# Patient Record
Sex: MALE | Race: WHITE | NOT HISPANIC OR LATINO | Employment: FULL TIME | URBAN - METROPOLITAN AREA
[De-identification: names, ages, dates, MRNs, and addresses within clinical notes are randomized per-mention and may not be internally consistent; named-entity substitution may affect disease eponyms.]

---
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
## 2024-03-14 ENCOUNTER — OFFICE VISIT (OUTPATIENT)
Dept: URGENT CARE | Facility: CLINIC | Age: 24
End: 2024-03-14
Payer: COMMERCIAL

## 2024-03-14 VITALS
BODY MASS INDEX: 20.73 KG/M2 | RESPIRATION RATE: 16 BRPM | OXYGEN SATURATION: 97 % | WEIGHT: 140 LBS | DIASTOLIC BLOOD PRESSURE: 67 MMHG | HEART RATE: 67 BPM | TEMPERATURE: 99 F | SYSTOLIC BLOOD PRESSURE: 112 MMHG | HEIGHT: 69 IN

## 2024-03-14 DIAGNOSIS — Z20.2 CHLAMYDIA CONTACT: Primary | ICD-10-CM

## 2024-03-14 DIAGNOSIS — Z20.2 ENCOUNTER FOR ASSESSMENT OF STD EXPOSURE: ICD-10-CM

## 2024-03-14 PROCEDURE — 99203 OFFICE O/P NEW LOW 30 MIN: CPT | Mod: S$GLB,,, | Performed by: STUDENT IN AN ORGANIZED HEALTH CARE EDUCATION/TRAINING PROGRAM

## 2024-03-14 RX ORDER — DOXYCYCLINE 100 MG/1
100 CAPSULE ORAL 2 TIMES DAILY
Qty: 14 CAPSULE | Refills: 0 | Status: SHIPPED | OUTPATIENT
Start: 2024-03-14 | End: 2024-03-21

## 2024-03-14 NOTE — PATIENT INSTRUCTIONS
Please follow up with your primary care provider within 2-5 days if your signs and symptoms have not resolved or worsen.     If your condition worsens or fails to improve we recommend that you receive another evaluation at the emergency room immediately or contact your primary medical clinic to discuss your concerns.   You must understand that you have received an Urgent Care treatment only and that you may be released before all of your medical problems are known or treated. You, the patient, will arrange for follow up care as instructed.           Please be sure to complete the full course of antibiotics.  We will call you within 1 week.  If you do not hear from us within 1 week then please give us a call.

## 2024-03-14 NOTE — PROGRESS NOTES
"Subjective:      Patient ID: Arjun Shepard is a 23 y.o. male.    Vitals:  height is 5' 9" (1.753 m) and weight is 63.5 kg (140 lb). His temperature is 98.7 °F (37.1 °C). His blood pressure is 112/67 and his pulse is 67. His respiration is 16 and oxygen saturation is 97%.     Chief Complaint: Exposure to STD    Patient is here for STD testing. His partner recently tested positive for chlamydia. He is not currently having any symptoms.    Exposure to STD  The patient's pertinent negatives include no genital itching, pelvic pain, penile discharge or testicular pain. This is a new problem. The patient is experiencing no pain. Pertinent negatives include no dysuria, urgency or urinary retention. Nothing aggravates the symptoms. He has tried nothing for the symptoms. He is sexually active. He inconsistently uses condoms. Yes, his partner has an STD.       Genitourinary:  Negative for dysuria, urgency, penile discharge, testicular pain and pelvic pain.      Objective:     Physical Exam   HENT:   Head: Normocephalic and atraumatic.   Nose: Nose normal.   Cardiovascular: Normal rate.   Pulmonary/Chest: Breath sounds normal.   Abdominal: Normal appearance.   Neurological: He is alert.   Psychiatric: His behavior is normal. Mood normal.       Assessment:     1. Chlamydia contact    2. Encounter for assessment of STD exposure        Plan:       Chlamydia contact  -     doxycycline (VIBRAMYCIN) 100 MG Cap; Take 1 capsule (100 mg total) by mouth 2 (two) times daily. for 7 days  Dispense: 14 capsule; Refill: 0  -     SureSwab(R)Chlamydia/N.Gonorrhoeae RNA,TMA    Encounter for assessment of STD exposure  -     doxycycline (VIBRAMYCIN) 100 MG Cap; Take 1 capsule (100 mg total) by mouth 2 (two) times daily. for 7 days  Dispense: 14 capsule; Refill: 0  -     SureSwab(R)Chlamydia/N.Gonorrhoeae RNA,TMA      Please follow up with your primary care provider within 2-5 days if your signs and symptoms have not resolved or worsen.     If " your condition worsens or fails to improve we recommend that you receive another evaluation at the emergency room immediately or contact your primary medical clinic to discuss your concerns.   You must understand that you have received an Urgent Care treatment only and that you may be released before all of your medical problems are known or treated. You, the patient, will arrange for follow up care as instructed.           Please be sure to complete the full course of antibiotics.  We will call you within 1 week.  If you do not hear from us within 1 week then please give us a call.    Medical Decision Making:   Differential Diagnosis:   Chlamydia exposure  Clinical Tests:   Lab Tests: Ordered and Reviewed       <> Summary of Lab: Gonorrhea negative   chlamydia positive  Urgent Care Management:  Patient is here for STD testing. His partner recently tested positive for chlamydia. He is not currently having any symptoms.  Physical exam was unremarkable.  Patient was sent home prophylactically with doxycycline to cover for chlamydia infection.  His lab results revealed that the patient was positive for chlamydia.  ED and return precautions were given.  The patient vocalized understanding.  We tried to call the notify the patient but he did not answer the phone we will try to call him later on today.

## 2024-03-17 LAB
APTIMA MEDIA TYPE: ABNORMAL
C. TRACHOMATIS DNA PROBE RESULT:: POSITIVE
N. GONORRHOEAE DNA PROBE RESULT:: NEGATIVE
SPECIMEN SOURCE: ABNORMAL

## 2024-03-18 ENCOUNTER — TELEPHONE (OUTPATIENT)
Dept: URGENT CARE | Facility: CLINIC | Age: 24
End: 2024-03-18
Payer: COMMERCIAL

## 2024-03-19 ENCOUNTER — TELEPHONE (OUTPATIENT)
Dept: URGENT CARE | Facility: CLINIC | Age: 24
End: 2024-03-19
Payer: COMMERCIAL

## 2024-03-20 ENCOUNTER — TELEPHONE (OUTPATIENT)
Dept: URGENT CARE | Facility: CLINIC | Age: 24
End: 2024-03-20
Payer: COMMERCIAL

## 2024-03-20 NOTE — TELEPHONE ENCOUNTER
Patient was notified about positive chlamydia results.  Patient stated that he has completed his medication.  All questions were answered.  Patient will need to come back in and be retested in 3 months.